# Patient Record
Sex: MALE | Race: WHITE | NOT HISPANIC OR LATINO | Employment: FULL TIME | URBAN - METROPOLITAN AREA
[De-identification: names, ages, dates, MRNs, and addresses within clinical notes are randomized per-mention and may not be internally consistent; named-entity substitution may affect disease eponyms.]

---

## 2018-11-05 ENCOUNTER — OFFICE VISIT (OUTPATIENT)
Dept: RETAIL CLINIC | Facility: CLINIC | Age: 63
End: 2018-11-05

## 2018-11-05 DIAGNOSIS — Z23 NEEDS FLU SHOT: Primary | ICD-10-CM

## 2024-03-14 ENCOUNTER — OFFICE VISIT (OUTPATIENT)
Dept: FAMILY MEDICINE CLINIC | Facility: CLINIC | Age: 69
End: 2024-03-14
Payer: MEDICARE

## 2024-03-14 VITALS
RESPIRATION RATE: 20 BRPM | WEIGHT: 182.3 LBS | OXYGEN SATURATION: 97 % | HEIGHT: 67 IN | TEMPERATURE: 98.4 F | BODY MASS INDEX: 28.61 KG/M2 | HEART RATE: 74 BPM | DIASTOLIC BLOOD PRESSURE: 74 MMHG | SYSTOLIC BLOOD PRESSURE: 130 MMHG

## 2024-03-14 DIAGNOSIS — E53.8 VITAMIN B12 DEFICIENCY: Primary | ICD-10-CM

## 2024-03-14 DIAGNOSIS — E55.9 VITAMIN D DEFICIENCY: ICD-10-CM

## 2024-03-14 DIAGNOSIS — E78.2 MIXED HYPERLIPIDEMIA: ICD-10-CM

## 2024-03-14 PROBLEM — H81.399 PERIPHERAL VERTIGO: Status: ACTIVE | Noted: 2024-03-14

## 2024-03-14 PROBLEM — I65.23 CAROTID ATHEROSCLEROSIS, BILATERAL: Status: ACTIVE | Noted: 2024-03-14

## 2024-03-14 RX ORDER — ERGOCALCIFEROL 1.25 MG/1
50000 CAPSULE ORAL WEEKLY
Qty: 6 CAPSULE | Refills: 0 | Status: SHIPPED | OUTPATIENT
Start: 2024-03-14

## 2024-03-14 RX ORDER — ATORVASTATIN CALCIUM 40 MG/1
40 TABLET, FILM COATED ORAL DAILY
Qty: 90 TABLET | Refills: 1 | Status: SHIPPED | OUTPATIENT
Start: 2024-03-14

## 2024-03-14 RX ORDER — CYANOCOBALAMIN 1000 UG/ML
1000 INJECTION, SOLUTION INTRAMUSCULAR; SUBCUTANEOUS DAILY
Status: SHIPPED | OUTPATIENT
Start: 2024-03-14 | End: 2024-03-17

## 2024-03-14 RX ORDER — CYANOCOBALAMIN 1000 UG/ML
1000 INJECTION, SOLUTION INTRAMUSCULAR; SUBCUTANEOUS ONCE
Status: COMPLETED | OUTPATIENT
Start: 2024-03-14 | End: 2024-03-14

## 2024-03-14 RX ORDER — OMEPRAZOLE 20 MG/1
20 CAPSULE, DELAYED RELEASE ORAL DAILY
COMMUNITY

## 2024-03-14 RX ADMIN — CYANOCOBALAMIN 1000 MCG: 1000 INJECTION, SOLUTION INTRAMUSCULAR; SUBCUTANEOUS at 10:16

## 2024-03-14 NOTE — ASSESSMENT & PLAN NOTE
Lab review indicated low Vit D (22.1)  Will prescribed vit d 50,000 units weekly for the next 6 weeks and reassess for continuing supplementation

## 2024-03-14 NOTE — ASSESSMENT & PLAN NOTE
Labs reviewed indicate low vitamin B12 (185). Pt was not taking PO vit B12 1000 mcg as prescribed  Will prescribed Vit B12 injections shots for the next 3 days and continue on PO supplement afterwards  Will repeat Vit B12 level at the next scheduled apt

## 2024-03-14 NOTE — ASSESSMENT & PLAN NOTE
Lipid abnormalities are worsening. Total cholesterol(214->242) and LDL cholesterol (148->164)  ASCVD risk is high based on these values, I recommend starting moderate to high intensity statin therapy    Plan:  Will start on atorvastatin 40 mg daily  Discussed medication dosage, use, side effects, and goals of treatment in detail.    Counseled patient on lifestyle modifications to help control hyperlipidemia.   Cholesterol lowering dietary information shared with patient.  Weight Loss encouraged    Patient Treatment Goals:   LDL goal to be less than 70    Followup at the next regular appointment.

## 2024-03-15 ENCOUNTER — CLINICAL SUPPORT (OUTPATIENT)
Dept: FAMILY MEDICINE CLINIC | Facility: CLINIC | Age: 69
End: 2024-03-15
Payer: MEDICARE

## 2024-03-15 DIAGNOSIS — E53.8 VITAMIN B12 DEFICIENCY: Primary | ICD-10-CM

## 2024-03-15 PROCEDURE — 96372 THER/PROPH/DIAG INJ SC/IM: CPT | Performed by: INTERNAL MEDICINE

## 2024-03-15 RX ADMIN — CYANOCOBALAMIN 1000 MCG: 1000 INJECTION, SOLUTION INTRAMUSCULAR; SUBCUTANEOUS at 10:47

## 2024-03-18 ENCOUNTER — CLINICAL SUPPORT (OUTPATIENT)
Dept: FAMILY MEDICINE CLINIC | Facility: CLINIC | Age: 69
End: 2024-03-18
Payer: MEDICARE

## 2024-03-18 DIAGNOSIS — E53.8 VITAMIN B12 DEFICIENCY: Primary | ICD-10-CM

## 2024-03-18 RX ORDER — CYANOCOBALAMIN 1000 UG/ML
1000 INJECTION, SOLUTION INTRAMUSCULAR; SUBCUTANEOUS
Status: SHIPPED | OUTPATIENT
Start: 2024-03-18

## 2024-03-18 RX ADMIN — CYANOCOBALAMIN 1000 MCG: 1000 INJECTION, SOLUTION INTRAMUSCULAR; SUBCUTANEOUS at 09:13

## 2024-04-18 DIAGNOSIS — E78.2 MIXED HYPERLIPIDEMIA: Primary | ICD-10-CM

## 2024-04-18 LAB
ALBUMIN SERPL-MCNC: 4.4 G/DL (ref 3.5–5.2)
ALBUMIN/GLOB SERPL: 1.9 G/DL
ALP SERPL-CCNC: 70 U/L (ref 39–117)
ALT SERPL-CCNC: 22 U/L (ref 1–41)
AST SERPL-CCNC: 21 U/L (ref 1–40)
BASOPHILS # BLD AUTO: 0.03 10*3/MM3 (ref 0–0.2)
BASOPHILS NFR BLD AUTO: 0.5 % (ref 0–1.5)
BILIRUB SERPL-MCNC: 0.5 MG/DL (ref 0–1.2)
BUN SERPL-MCNC: 9 MG/DL (ref 8–23)
BUN/CREAT SERPL: 7.3 (ref 7–25)
CALCIUM SERPL-MCNC: 9.6 MG/DL (ref 8.6–10.5)
CHLORIDE SERPL-SCNC: 106 MMOL/L (ref 98–107)
CHOLEST SERPL-MCNC: 153 MG/DL (ref 0–200)
CO2 SERPL-SCNC: 27 MMOL/L (ref 22–29)
CREAT SERPL-MCNC: 1.24 MG/DL (ref 0.76–1.27)
EGFRCR SERPLBLD CKD-EPI 2021: 64.1 ML/MIN/1.73
EOSINOPHIL # BLD AUTO: 0.12 10*3/MM3 (ref 0–0.4)
EOSINOPHIL NFR BLD AUTO: 1.9 % (ref 0.3–6.2)
ERYTHROCYTE [DISTWIDTH] IN BLOOD BY AUTOMATED COUNT: 12.9 % (ref 12.3–15.4)
GLOBULIN SER CALC-MCNC: 2.3 GM/DL
GLUCOSE SERPL-MCNC: 105 MG/DL (ref 65–99)
HCT VFR BLD AUTO: 41.7 % (ref 37.5–51)
HDLC SERPL-MCNC: 51 MG/DL (ref 40–60)
HGB BLD-MCNC: 14.1 G/DL (ref 13–17.7)
IMM GRANULOCYTES # BLD AUTO: 0.02 10*3/MM3 (ref 0–0.05)
IMM GRANULOCYTES NFR BLD AUTO: 0.3 % (ref 0–0.5)
LDLC SERPL CALC-MCNC: 82 MG/DL (ref 0–100)
LDLC/HDLC SERPL: 1.58 {RATIO}
LYMPHOCYTES # BLD AUTO: 2.17 10*3/MM3 (ref 0.7–3.1)
LYMPHOCYTES NFR BLD AUTO: 34.3 % (ref 19.6–45.3)
MCH RBC QN AUTO: 32 PG (ref 26.6–33)
MCHC RBC AUTO-ENTMCNC: 33.8 G/DL (ref 31.5–35.7)
MCV RBC AUTO: 94.6 FL (ref 79–97)
MONOCYTES # BLD AUTO: 0.56 10*3/MM3 (ref 0.1–0.9)
MONOCYTES NFR BLD AUTO: 8.8 % (ref 5–12)
NEUTROPHILS # BLD AUTO: 3.43 10*3/MM3 (ref 1.7–7)
NEUTROPHILS NFR BLD AUTO: 54.2 % (ref 42.7–76)
NRBC BLD AUTO-RTO: 0 /100 WBC (ref 0–0.2)
PLATELET # BLD AUTO: 246 10*3/MM3 (ref 140–450)
POTASSIUM SERPL-SCNC: 4.4 MMOL/L (ref 3.5–5.2)
PROT SERPL-MCNC: 6.7 G/DL (ref 6–8.5)
RBC # BLD AUTO: 4.41 10*6/MM3 (ref 4.14–5.8)
SODIUM SERPL-SCNC: 142 MMOL/L (ref 136–145)
TRIGL SERPL-MCNC: 108 MG/DL (ref 0–150)
VLDLC SERPL CALC-MCNC: 20 MG/DL (ref 5–40)
WBC # BLD AUTO: 6.33 10*3/MM3 (ref 3.4–10.8)

## 2024-04-25 ENCOUNTER — OFFICE VISIT (OUTPATIENT)
Dept: FAMILY MEDICINE CLINIC | Facility: CLINIC | Age: 69
End: 2024-04-25
Payer: MEDICARE

## 2024-04-25 VITALS
DIASTOLIC BLOOD PRESSURE: 86 MMHG | HEIGHT: 67 IN | TEMPERATURE: 98.1 F | RESPIRATION RATE: 16 BRPM | SYSTOLIC BLOOD PRESSURE: 136 MMHG | WEIGHT: 186.7 LBS | BODY MASS INDEX: 29.3 KG/M2 | HEART RATE: 68 BPM | OXYGEN SATURATION: 98 %

## 2024-04-25 DIAGNOSIS — Z12.11 SCREEN FOR COLON CANCER: ICD-10-CM

## 2024-04-25 DIAGNOSIS — Z23 NEED FOR VACCINATION: ICD-10-CM

## 2024-04-25 DIAGNOSIS — E78.2 MIXED HYPERLIPIDEMIA: Primary | ICD-10-CM

## 2024-04-25 DIAGNOSIS — E55.9 VITAMIN D DEFICIENCY, UNSPECIFIED: ICD-10-CM

## 2024-04-25 DIAGNOSIS — E53.8 VITAMIN B12 DEFICIENCY: ICD-10-CM

## 2024-04-25 LAB
25(OH)D3+25(OH)D2 SERPL-MCNC: 36.7 NG/ML (ref 30–100)
VIT B12 SERPL-MCNC: 440 PG/ML (ref 211–946)

## 2024-04-25 NOTE — PROGRESS NOTES
The ABCs of the Annual Wellness Visit  Subsequent Medicare Wellness Visit    Subjective    Hector Diamond is a 68 y.o. male who presents for a Subsequent Medicare Wellness Visit.    The following portions of the patient's history were reviewed and   updated as appropriate: allergies, current medications, past family history, past medical history, past social history, past surgical history, and problem list.    Compared to one year ago, the patient feels his physical   health is the same.    Compared to one year ago, the patient feels his mental   health is the same.    Recent Hospitalizations:  He was not admitted to the hospital during the last year.       Current Medical Providers:  Patient Care Team:  Ivonne Ortiz MD as PCP - General (Internal Medicine)  Leandro Cash MD as Consulting Physician (Gastroenterology)    Outpatient Medications Prior to Visit   Medication Sig Dispense Refill    atorvastatin (Lipitor) 40 MG tablet Take 1 tablet by mouth Daily. 90 tablet 1    cyanocobalamin ([facility] Vitamin B-12) 1000 MCG tablet Take 1 tablet by mouth Daily. 30 tablet 2    omeprazole (priLOSEC) 20 MG capsule Take 1 capsule by mouth Daily.      vitamin D (ERGOCALCIFEROL) 1.25 MG (75694 UT) capsule capsule Take 1 capsule by mouth 1 (One) Time Per Week. 6 capsule 0     Facility-Administered Medications Prior to Visit   Medication Dose Route Frequency Provider Last Rate Last Admin    cyanocobalamin injection 1,000 mcg  1,000 mcg Intramuscular Q28 Days Ivonne Ortiz MD   1,000 mcg at 03/18/24 0913       No opioid medication identified on active medication list. I have reviewed chart for other potential  high risk medication/s and harmful drug interactions in the elderly.        Aspirin is not on active medication list.  Aspirin use is not indicated based on review of current medical condition/s. Risk of harm outweighs potential benefits.  .    Patient Active Problem List   Diagnosis    Carotid atherosclerosis,  "bilateral    Peripheral vertigo    Vitamin B12 deficiency    Vitamin D deficiency, unspecified    Mixed hyperlipidemia     Advance Care Planning   Advance Care Planning     Advance Directive is not on file.  ACP discussion was held with the patient during this visit. Patient does not have an advance directive, information provided.     Objective    Vitals:    04/25/24 0906   BP: 136/86   BP Location: Right arm   Patient Position: Sitting   Cuff Size: Adult   Pulse: 68   Resp: 16   Temp: 98.1 °F (36.7 °C)   TempSrc: Oral   SpO2: 98%   Weight: 84.7 kg (186 lb 11.2 oz)   Height: 170.2 cm (67.01\")     Estimated body mass index is 29.23 kg/m² as calculated from the following:    Height as of this encounter: 170.2 cm (67.01\").    Weight as of this encounter: 84.7 kg (186 lb 11.2 oz).    BMI is >= 25 and <30. (Overweight) The following options were offered after discussion;: weight loss educational material (shared in after visit summary), exercise counseling/recommendations, and nutrition counseling/recommendations      Does the patient have evidence of cognitive impairment? Yes    Lab Results   Component Value Date    CHLPL 153 04/18/2024    TRIG 108 04/18/2024    HDL 51 04/18/2024    LDL 82 04/18/2024    VLDL 20 04/18/2024        HEALTH RISK ASSESSMENT    Smoking Status:  Social History     Tobacco Use   Smoking Status Former    Current packs/day: 0.00    Average packs/day: 1 pack/day for 34.0 years (34.0 ttl pk-yrs)    Types: Cigarettes    Start date: 01/1975    Quit date: 01/2009    Years since quitting: 15.3    Passive exposure: Past   Smokeless Tobacco Never     Alcohol Consumption:  Social History     Substance and Sexual Activity   Alcohol Use Yes    Comment: ONCE A WEEK     Fall Risk Screen:    RONITADI Fall Risk Assessment was completed, and patient is at MODERATE risk for falls. Assessment completed on:3/14/2024    Depression Screening:      3/14/2024     9:12 AM   PHQ-2/PHQ-9 Depression Screening   Little " Interest or Pleasure in Doing Things 0-->not at all   Feeling Down, Depressed or Hopeless 0-->not at all   PHQ-9: Brief Depression Severity Measure Score 0       Health Habits and Functional and Cognitive Screening:       No data to display                Age-appropriate Screening Schedule:  Refer to the list below for future screening recommendations based on patient's age, sex and/or medical conditions. Orders for these recommended tests are listed in the plan section. The patient has been provided with a written plan.    Health Maintenance   Topic Date Due    COLORECTAL CANCER SCREENING  Never done    RSV Vaccine - Adults (1 - 1-dose 60+ series) Never done    ANNUAL WELLNESS VISIT  04/11/2024    ZOSTER VACCINE (1 of 2) 04/25/2024 (Originally 6/14/2007)    TDAP/TD VACCINES (1 - Tdap) 04/25/2025 (Originally 6/14/1976)    INFLUENZA VACCINE  08/01/2024    LIPID PANEL  04/18/2025    BMI FOLLOWUP  04/25/2025    HEPATITIS C SCREENING  Completed    COVID-19 Vaccine  Completed    Pneumococcal Vaccine 65+  Completed    AAA SCREEN (ONE-TIME)  Completed                  CMS Preventative Services Quick Reference  Risk Factors Identified During Encounter  Immunizations Discussed/Encouraged: Tdap, Prevnar 20 (Pneumococcal 20-valent conjugate), Shingrix, and RSV (Respiratory Syncytial Virus)  Tobacco Use/Dependance Risk (use dotphrase .tobaccocessation for documentation)  Dental Screening Recommended  Vision Screening Recommended  The above risks/problems have been discussed with the patient.  Pertinent information has been shared with the patient in the After Visit Summary.  An After Visit Summary and PPPS were made available to the patient.    Follow Up:   Next Medicare Wellness visit to be scheduled in 1 year.       Additional E&M Note during same encounter follows:  Patient has multiple medical problems which are significant and separately identifiable that require additional work above and beyond the Medicare Wellness  "Visit.      Chief Complaint  Annual Exam, Hyperlipidemia, and Vitamin D Deficiency    Subjective        Hyperlipidemia      Hector Diamond is also being seen today for annual physical and follow up of hyperlipidemia, vit B 12 and D deficiency. He has no complaint today.  He has been trying to maintain a healthy diet, eating more salads and has cut down on red meat & fatty foods.   Lab review indicate lipid abnormalities significantly improved. Has been compliant on atorvastatin. To get vit B12 and D levels today.         Objective   Vital Signs:  /86 (BP Location: Right arm, Patient Position: Sitting, Cuff Size: Adult)   Pulse 68   Temp 98.1 °F (36.7 °C) (Oral)   Resp 16   Ht 170.2 cm (67.01\")   Wt 84.7 kg (186 lb 11.2 oz)   SpO2 98%   BMI 29.23 kg/m²     Physical Exam  Constitutional:       Appearance: Normal appearance.   HENT:      Head: Normocephalic and atraumatic.   Cardiovascular:      Rate and Rhythm: Normal rate and regular rhythm.      Heart sounds: Normal heart sounds.   Pulmonary:      Breath sounds: Normal breath sounds.   Abdominal:      General: Bowel sounds are normal. There is no distension.      Palpations: Abdomen is soft.      Tenderness: There is no abdominal tenderness.   Neurological:      Mental Status: He is alert and oriented to person, place, and time.   Psychiatric:         Mood and Affect: Mood normal.          The following data was reviewed by: Ivonne Ortiz MD on 04/25/2024:  Common labs          2/20/2024    09:34 4/18/2024    08:17   Common Labs   Glucose  105    BUN  9    Creatinine  1.24    Sodium  142    Potassium  4.4    Chloride  106    Calcium  9.6    Total Protein  6.7    Albumin  4.4    Total Bilirubin  0.5    Alkaline Phosphatase  70    AST (SGOT)  21    ALT (SGPT)  22    WBC 14.41     6.33    Hemoglobin 13.8     14.1    Hematocrit 41.2     41.7    Platelets 559     246    Total Cholesterol  153    Triglycerides  108    HDL Cholesterol  51    LDL Cholesterol  "  82       Details          This result is from an external source.                        Assessment and Plan   Diagnoses and all orders for this visit:    1. Mixed hyperlipidemia (Primary)  Assessment & Plan:   Lipid abnormalities are improving with treatment    Plan:  Continue same medication/s without change.  Atorvastatin 40 mg daily  Counseled patient on lifestyle modifications to help control hyperlipidemia.   Cholesterol lowering dietary information shared with patient.  Advised patient to exercise for 150 minutes weekly. (30 minute brisk walk, 5 days a week for example)  Weight Loss encouraged    Patient Treatment Goals:   LDL goal to be less than 70    Followup in 6 months.      2. Vitamin D deficiency, unspecified  Assessment & Plan:  Pt has received 6 doses of weekly vit D 50,000 units  To repeat Vit D today    Orders:  -     Vitamin D,25-Hydroxy    3. Vitamin B12 deficiency  Assessment & Plan:  Lab review indicated very low vit B 12 (185)  Pt has received 3 doses on IM vit B 12 and is currently on daily oral Vit B12 supplementation  To repeat Vit B 12 today    Orders:  -     Vitamin B12    4. Need for vaccination  Comments:  Counselled patient to get RSV vaccine at his preferred pharmacy  Pt refused Tdap and shingrix vaccination  To get PCV 20 today  Orders:  -     Pneumococcal Conjugate Vaccine 20-Valent (PCV20)    5. Screen for colon cancer  -     Ambulatory Referral For Screening Colonoscopy             Follow Up   No follow-ups on file.  Patient was given instructions and counseling regarding his condition or for health maintenance advice. Please see specific information pulled into the AVS if appropriate.

## 2024-04-25 NOTE — ASSESSMENT & PLAN NOTE
Lipid abnormalities are improving with treatment    Plan:  Continue same medication/s without change.  Atorvastatin 40 mg daily  Counseled patient on lifestyle modifications to help control hyperlipidemia.   Cholesterol lowering dietary information shared with patient.  Advised patient to exercise for 150 minutes weekly. (30 minute brisk walk, 5 days a week for example)  Weight Loss encouraged    Patient Treatment Goals:   LDL goal to be less than 70    Followup in 6 months.

## 2024-04-25 NOTE — ASSESSMENT & PLAN NOTE
Lab review indicated very low vit B 12 (185)  Pt has received 3 doses on IM vit B 12 and is currently on daily oral Vit B12 supplementation  To repeat Vit B 12 today

## 2024-05-10 DIAGNOSIS — E55.9 VITAMIN D DEFICIENCY: ICD-10-CM

## 2024-05-13 RX ORDER — ERGOCALCIFEROL 1.25 MG/1
50000 CAPSULE ORAL WEEKLY
Qty: 6 CAPSULE | Refills: 0 | Status: SHIPPED | OUTPATIENT
Start: 2024-05-13

## 2024-05-13 NOTE — TELEPHONE ENCOUNTER
Rx Refill Note  Requested Prescriptions     Pending Prescriptions Disp Refills    vitamin D (ERGOCALCIFEROL) 1.25 MG (82995 UT) capsule capsule [Pharmacy Med Name: Vitamin D (Ergocalciferol) Oral Capsule 1.25 MG (55345 UT)] 6 capsule 0     Sig: TAKE 1 CAPSULE BY MOUTH 1 TIME A WEEK      Last office visit with prescribing clinician: 4/25/2024   Last telemedicine visit with prescribing clinician: Visit date not found   Next office visit with prescribing clinician: 11/11/2024                         Would you like a call back once the refill request has been completed: [] Yes [] No    If the office needs to give you a call back, can they leave a voicemail: [] Yes [] No    Joellen Doyle MA  05/13/24, 07:56 EDT

## 2024-07-03 DIAGNOSIS — E55.9 VITAMIN D DEFICIENCY: ICD-10-CM

## 2024-07-05 RX ORDER — ERGOCALCIFEROL 1.25 MG/1
50000 CAPSULE ORAL WEEKLY
Qty: 6 CAPSULE | Refills: 0 | Status: SHIPPED | OUTPATIENT
Start: 2024-07-05

## 2024-07-15 ENCOUNTER — TELEPHONE (OUTPATIENT)
Dept: GASTROENTEROLOGY | Facility: CLINIC | Age: 69
End: 2024-07-15
Payer: MEDICARE

## 2024-07-15 NOTE — TELEPHONE ENCOUNTER
FAST TRACK - REFERRAL - DR. MURPHY  LAST COLONOSCOPY 06/14/2022 BY DR. BETANCOURT - REPEAT IN 6 MONTHS  PERSONAL HISTORY POLYPS  NO FAMILY HISTORY CRC/P  SCHEDULE AT [place].

## 2024-07-17 ENCOUNTER — PREP FOR SURGERY (OUTPATIENT)
Dept: SURGERY | Facility: SURGERY CENTER | Age: 69
End: 2024-07-17

## 2024-07-17 DIAGNOSIS — Z86.010 HISTORY OF COLON POLYPS: ICD-10-CM

## 2024-07-17 DIAGNOSIS — Z12.11 ENCOUNTER FOR SCREENING FOR MALIGNANT NEOPLASM OF COLON: Primary | ICD-10-CM

## 2024-07-22 PROBLEM — Z86.010 HISTORY OF COLON POLYPS: Status: ACTIVE | Noted: 2024-07-17

## 2024-07-22 PROBLEM — Z12.11 ENCOUNTER FOR SCREENING FOR MALIGNANT NEOPLASM OF COLON: Status: ACTIVE | Noted: 2024-07-17

## 2024-07-22 PROBLEM — Z86.0100 HISTORY OF COLON POLYPS: Status: ACTIVE | Noted: 2024-07-17

## 2024-08-02 ENCOUNTER — TELEPHONE (OUTPATIENT)
Dept: GASTROENTEROLOGY | Facility: CLINIC | Age: 69
End: 2024-08-02
Payer: MEDICARE

## 2024-08-02 NOTE — TELEPHONE ENCOUNTER
Received emailed from [facility] scheduling:          He has been removed from the schedule.                  [facility]          Surgical Services   [facility]  [address] [place] [address]    Phone: [phone]  Fax: [phone]

## 2024-09-07 DIAGNOSIS — E55.9 VITAMIN D DEFICIENCY: ICD-10-CM

## 2024-09-09 RX ORDER — ERGOCALCIFEROL 1.25 MG/1
50000 CAPSULE, LIQUID FILLED ORAL WEEKLY
Qty: 6 CAPSULE | Refills: 3 | Status: SHIPPED | OUTPATIENT
Start: 2024-09-09

## 2024-10-02 ENCOUNTER — PATIENT OUTREACH (OUTPATIENT)
Dept: FAMILY MEDICINE CLINIC | Facility: CLINIC | Age: 69
End: 2024-10-02
Payer: MEDICARE

## 2024-11-06 DIAGNOSIS — E53.8 VITAMIN B12 DEFICIENCY: ICD-10-CM

## 2024-11-06 DIAGNOSIS — E55.9 VITAMIN D DEFICIENCY, UNSPECIFIED: Primary | ICD-10-CM

## 2024-11-06 DIAGNOSIS — Z12.5 SCREENING PSA (PROSTATE SPECIFIC ANTIGEN): ICD-10-CM

## 2024-11-06 DIAGNOSIS — E78.2 MIXED HYPERLIPIDEMIA: ICD-10-CM

## 2024-11-07 LAB
25(OH)D3+25(OH)D2 SERPL-MCNC: 40.3 NG/ML (ref 30–100)
ALBUMIN SERPL-MCNC: 4.4 G/DL (ref 3.5–5.2)
ALBUMIN/GLOB SERPL: 1.8 G/DL
ALP SERPL-CCNC: 69 U/L (ref 39–117)
ALT SERPL-CCNC: 29 U/L (ref 1–41)
AST SERPL-CCNC: 23 U/L (ref 1–40)
BILIRUB SERPL-MCNC: 0.6 MG/DL (ref 0–1.2)
BUN SERPL-MCNC: 13 MG/DL (ref 8–23)
BUN/CREAT SERPL: 11.7 (ref 7–25)
CALCIUM SERPL-MCNC: 9.2 MG/DL (ref 8.6–10.5)
CHLORIDE SERPL-SCNC: 105 MMOL/L (ref 98–107)
CHOLEST SERPL-MCNC: 169 MG/DL (ref 0–200)
CO2 SERPL-SCNC: 25 MMOL/L (ref 22–29)
CREAT SERPL-MCNC: 1.11 MG/DL (ref 0.76–1.27)
EGFRCR SERPLBLD CKD-EPI 2021: 72.8 ML/MIN/1.73
GLOBULIN SER CALC-MCNC: 2.4 GM/DL
GLUCOSE SERPL-MCNC: 105 MG/DL (ref 65–99)
HDLC SERPL-MCNC: 45 MG/DL (ref 40–60)
LDLC SERPL CALC-MCNC: 105 MG/DL (ref 0–100)
LDLC/HDLC SERPL: 2.28 {RATIO}
POTASSIUM SERPL-SCNC: 4 MMOL/L (ref 3.5–5.2)
PROT SERPL-MCNC: 6.8 G/DL (ref 6–8.5)
PSA SERPL-MCNC: 1.06 NG/ML (ref 0–4)
SODIUM SERPL-SCNC: 142 MMOL/L (ref 136–145)
TRIGL SERPL-MCNC: 106 MG/DL (ref 0–150)
VIT B12 SERPL-MCNC: 403 PG/ML (ref 211–946)
VLDLC SERPL CALC-MCNC: 19 MG/DL (ref 5–40)

## 2024-11-11 ENCOUNTER — OFFICE VISIT (OUTPATIENT)
Dept: FAMILY MEDICINE CLINIC | Facility: CLINIC | Age: 69
End: 2024-11-11
Payer: MEDICARE

## 2024-11-11 VITALS
TEMPERATURE: 97 F | HEIGHT: 67 IN | SYSTOLIC BLOOD PRESSURE: 128 MMHG | OXYGEN SATURATION: 96 % | HEART RATE: 79 BPM | DIASTOLIC BLOOD PRESSURE: 70 MMHG | RESPIRATION RATE: 14 BRPM | WEIGHT: 185.2 LBS | BODY MASS INDEX: 29.07 KG/M2

## 2024-11-11 DIAGNOSIS — Z23 NEED FOR VACCINATION: ICD-10-CM

## 2024-11-11 DIAGNOSIS — E78.2 MIXED HYPERLIPIDEMIA: Primary | ICD-10-CM

## 2024-11-11 DIAGNOSIS — R07.9 RIGHT-SIDED CHEST PAIN: ICD-10-CM

## 2024-11-11 DIAGNOSIS — E55.9 VITAMIN D DEFICIENCY, UNSPECIFIED: ICD-10-CM

## 2024-11-11 DIAGNOSIS — E53.8 VITAMIN B12 DEFICIENCY: ICD-10-CM

## 2024-11-11 PROCEDURE — 93000 ELECTROCARDIOGRAM COMPLETE: CPT | Performed by: INTERNAL MEDICINE

## 2024-11-11 PROCEDURE — 1159F MED LIST DOCD IN RCRD: CPT | Performed by: INTERNAL MEDICINE

## 2024-11-11 PROCEDURE — G0008 ADMIN INFLUENZA VIRUS VAC: HCPCS | Performed by: INTERNAL MEDICINE

## 2024-11-11 PROCEDURE — 99214 OFFICE O/P EST MOD 30 MIN: CPT | Performed by: INTERNAL MEDICINE

## 2024-11-11 PROCEDURE — 90662 IIV NO PRSV INCREASED AG IM: CPT | Performed by: INTERNAL MEDICINE

## 2024-11-11 PROCEDURE — 1126F AMNT PAIN NOTED NONE PRSNT: CPT | Performed by: INTERNAL MEDICINE

## 2024-11-11 PROCEDURE — 1160F RVW MEDS BY RX/DR IN RCRD: CPT | Performed by: INTERNAL MEDICINE

## 2024-11-11 NOTE — PROGRESS NOTES
"  Chief Complaint   Patient presents with    Hyperlipidemia    Results     FUP LABS    Chest Pain    Shortness of Breath      History of Present Illness:  Patient is here for follow-up of hyperlipidemia, vitamin D and B12 deficiency.  He reports intermittent right-sided chest pain that alcohol couple of times a week about a month ago but has subsided.  Last episode was about a month ago, associated with mild SOB.  Pain is mild, lasting for couple of seconds and spontaneously goes away, nonradiating, no known relieving or aggravating factor.  No associated nausea or vomiting, lightheadedness or sweating.  He stopped smoking pot [THC] symptoms seems to have improved.    PMH:   Outpatient Medications Prior to Visit   Medication Sig Dispense Refill    atorvastatin (Lipitor) 40 MG tablet Take 1 tablet by mouth Daily. 90 tablet 1    cyanocobalamin ([facility] Vitamin B-12) 1000 MCG tablet Take 1 tablet by mouth Daily. 30 tablet 2    omeprazole (priLOSEC) 20 MG capsule Take 1 capsule by mouth Daily.      vitamin D (ERGOCALCIFEROL) 1.25 MG (28213 UT) capsule capsule TAKE 1 CAPSULE BY MOUTH 1 TIME A WEEK 6 capsule 3     No facility-administered medications prior to visit.      Allergies   Allergen Reactions    Levaquin [Levofloxacin]      History reviewed. No pertinent surgical history.  family history is not on file.   reports that he quit smoking about 15 years ago. His smoking use included cigarettes. He started smoking about 49 years ago. He has a 34 pack-year smoking history. He has been exposed to tobacco smoke. He has never used smokeless tobacco. He reports current alcohol use. He reports current drug use. Drug: Marijuana.     /70   Pulse 79   Temp 97 °F (36.1 °C) (Temporal)   Resp 14   Ht 170.2 cm (67.01\")   Wt 84 kg (185 lb 3.2 oz)   SpO2 96%   BMI 29.00 kg/m²   Physical Exam  Constitutional:       Appearance: Normal appearance.   HENT:      Head: Normocephalic and atraumatic.   Cardiovascular:      Rate and " Rhythm: Normal rate and regular rhythm.      Pulses: Normal pulses.      Heart sounds: Normal heart sounds.   Pulmonary:      Effort: Pulmonary effort is normal. No respiratory distress.      Breath sounds: Normal breath sounds. No wheezing.   Chest:      Chest wall: No tenderness.   Abdominal:      General: Bowel sounds are normal.      Palpations: Abdomen is soft.      Tenderness: There is no abdominal tenderness.   Neurological:      Mental Status: He is alert.          The following data was reviewed by: Ivonne Ortiz MD on 11/11/2024:  CMP          4/18/2024    08:17 11/6/2024    09:04   CMP   Glucose 105  105    BUN 9  13    Creatinine 1.24  1.11    Sodium 142  142    Potassium 4.4  4.0    Chloride 106  105    Calcium 9.6  9.2    Total Protein 6.7  6.8    Albumin 4.4  4.4    Globulin 2.3  2.4    Total Bilirubin 0.5  0.6    Alkaline Phosphatase 70  69    AST (SGOT) 21  23    ALT (SGPT) 22  29    BUN/Creatinine Ratio 7.3  11.7      Lipid Panel          4/18/2024    08:17 11/6/2024    09:04   Lipid Panel   Total Cholesterol 153  169    Triglycerides 108  106    HDL Cholesterol 51  45    VLDL Cholesterol 20  19    LDL Cholesterol  82  105    LDL/HDL Ratio 1.58  2.28      Data reviewed : Cardiology studies EKG normal, no sign of ischemia or infarction      Diagnoses and all orders for this visit:    1. Mixed hyperlipidemia (Primary)  Assessment & Plan:   Lipid abnormalities are worsening due to medication noncompliance    Plan:  Counseled patient on the importance of medication compliance  To Continue same medication/s without change.    Counseled patient on lifestyle modifications to help control hyperlipidemia.   Cholesterol lowering dietary information shared with patient.  Advised patient to exercise for 150 minutes weekly. (30 minute brisk walk, 5 days a week for example)  Weight Loss encouraged  Stop tobacco use encouraged    Patient Treatment Goals:   LDL goal less than 70    Followup in 6 months.      2.  Vitamin D deficiency, unspecified  Assessment & Plan:  Serum vitamin D level improving  Continue on vitamin D supplementation daily      3. Vitamin B12 deficiency  Assessment & Plan:  Serum Vitamin B12 improving  Continue on vitamin B12 supplements daily      4. Right-sided chest pain  Comments:  EKG normal  Tylenol/ibuprofen as needed  Orders:  -     ECG 12 Lead    5. Need for vaccination  -     Fluzone High-Dose 65+yrs (0409-0862)             Return in about 6 months (around 5/11/2025) for Follow up with fasting lipid panel.

## 2024-11-11 NOTE — ASSESSMENT & PLAN NOTE
Lipid abnormalities are worsening due to medication noncompliance    Plan:  Counseled patient on the importance of medication compliance  To Continue same medication/s without change.    Counseled patient on lifestyle modifications to help control hyperlipidemia.   Cholesterol lowering dietary information shared with patient.  Advised patient to exercise for 150 minutes weekly. (30 minute brisk walk, 5 days a week for example)  Weight Loss encouraged  Stop tobacco use encouraged    Patient Treatment Goals:   LDL goal less than 70    Followup in 6 months.

## 2025-03-22 DIAGNOSIS — E78.2 MIXED HYPERLIPIDEMIA: ICD-10-CM

## 2025-03-24 RX ORDER — ATORVASTATIN CALCIUM 40 MG/1
40 TABLET, FILM COATED ORAL DAILY
Qty: 90 TABLET | Refills: 0 | Status: SHIPPED | OUTPATIENT
Start: 2025-03-24

## 2025-04-13 DIAGNOSIS — E55.9 VITAMIN D DEFICIENCY: ICD-10-CM

## 2025-04-14 RX ORDER — ERGOCALCIFEROL 1.25 MG/1
50000 CAPSULE, LIQUID FILLED ORAL WEEKLY
Qty: 6 CAPSULE | Refills: 0 | Status: SHIPPED | OUTPATIENT
Start: 2025-04-14

## 2025-05-20 NOTE — PROGRESS NOTES
"    Chief Complaint   Patient presents with    Establish Care     Spots on legs/arms, establishing care      History of Present Illness:  Patient presented to the clinic today to establish care.  He has a history of peripheral vertigo and bilateral carotid atherosclerosis.  He was seen last month at the ED for vertigo, was given meclizine and Zofran PRN for nausea.  Today, he reports to be doing okay in general, no longer feeling nauseous or having vertigo.  No complaints today, just looking to establish care with a new PCP.      Labs review was indicative of vitamin B12 (185) and vitamin D deficiency(22.1).  Patient was not taking his vitamin B12 and D supplement as prescribed.    Outpatient Medications Prior to Visit   Medication Sig Dispense Refill    omeprazole (priLOSEC) 20 MG capsule Take 1 capsule by mouth Daily.      cyanocobalamin ([employer] Vitamin B-12) 1000 MCG tablet Take  by mouth. (Patient not taking: Reported on 3/14/2024)      VITAMIN D PO Take  by mouth. (Patient not taking: Reported on 3/14/2024)       No facility-administered medications prior to visit.      Allergies   Allergen Reactions    Levaquin [Levofloxacin]      History reviewed. No pertinent surgical history.  family history is not on file.   reports that he has quit smoking. His smoking use included cigarettes. He started smoking about 49 years ago. He has a 34 pack-year smoking history. He has been exposed to tobacco smoke. He has never used smokeless tobacco. He reports current alcohol use. He reports current drug use. Drug: Marijuana.     /74 (BP Location: Right arm, Patient Position: Sitting, Cuff Size: Adult)   Pulse 74   Temp 98.4 °F (36.9 °C) (Oral)   Resp 20   Ht 170.2 cm (67\")   Wt 82.7 kg (182 lb 4.8 oz)   SpO2 97%   BMI 28.55 kg/m²   Physical Exam  Constitutional:       Appearance: Normal appearance.   HENT:      Head: Normocephalic and atraumatic.   Cardiovascular:      Rate and Rhythm: Normal rate and regular rhythm. "      Heart sounds: Normal heart sounds.   Pulmonary:      Breath sounds: Normal breath sounds.   Abdominal:      General: Bowel sounds are normal.      Palpations: Abdomen is soft.   Skin:     General: Skin is warm and dry.   Neurological:      Mental Status: He is alert and oriented to person, place, and time.   Psychiatric:         Mood and Affect: Mood normal.          The following data was reviewed by: Ivonne Ortiz MD on 03/14/2024:  Common labs          4/11/2023    13:42 2/20/2024    09:34   Common Labs   WBC 7.52     14.41       Hemoglobin 14.7     13.8       Hematocrit 45.0     41.2       Platelets 280     559       Hemoglobin A1C 5.5        PSA 0.77           Details          This result is from an external source.                  Diagnoses and all orders for this visit:    1. Vitamin B12 deficiency (Primary)  -     cyanocobalamin injection 1,000 mcg  -     cyanocobalamin ([employer] Vitamin B-12) 1000 MCG tablet; Take 1 tablet by mouth Daily.  Dispense: 30 tablet; Refill: 2  -     cyanocobalamin injection 1,000 mcg    2. Vitamin D deficiency  -     vitamin D (ERGOCALCIFEROL) 1.25 MG (51224 UT) capsule capsule; Take 1 capsule by mouth 1 (One) Time Per Week.  Dispense: 6 capsule; Refill: 0    3. Mixed hyperlipidemia             No follow-ups on file.      [Patient] : patient [Family Member] : family member [FreeTextEntry1] : taxing effort to leave home due to multiple health conditions [FreeTextEntry2] : Chance Bustos 86 y/o male with PMHx of AFib, COPD, HTN, MI, hypercholesteremia, morbid obesity, compression fracture, and depression.  Patient is being seen today for TCM. Spouse accompanied patient during today's visit.  [] Today's Visit and Review - 5/13/2025 - AOx3, denies SOB, CP or dizziness, no changes in bladder and bowel functions. - Mucus heard in lungs on exam. Advised Mucinex and antibiotic sent to pharmacy. - 5 days of Cephaloxin remaining. - Patient to follow with in-home PT. Farhat (PT) arrived during visit. - HHA to attend 4 hours/day, 5 days/week. Patient reluctant to permit this but family believe this will be helpful. Encouraged by provider.  - Stitches removed during visit. once spot with stitch under the skin and bleeding. Steri Strips placed during visit. Bacitracin to be used on incision. Will come back to remove the last stitch next week - Medications reviewed and reconciled during visit.   # Ambulation: homebound, limited ambulation with walker # Cognition and memory: good # Mood: fair # Communication: good, responding appropriately # Appetite: no reported issues  <> Dysphagia: # Sleep hygiene: no reported issues. # BM pattern: stool is soft, no blood in it, # Urinary: denies increased frequency, pain with urination, change to color or odor of urine. # Skin: incision sutures removed # Sensory deficits: mildly [place], does not use hearing aids # Compliance:  #Social -  with two adult children. - Former smoker.  #DMEs - Walker - [employer]  Review of systems otherwise NEGATIVE. # Chronic disease:  # Specialists:

## 2025-05-23 ENCOUNTER — OFFICE VISIT (OUTPATIENT)
Dept: FAMILY MEDICINE CLINIC | Facility: CLINIC | Age: 70
End: 2025-05-23
Payer: MEDICARE

## 2025-05-23 VITALS
BODY MASS INDEX: 29.19 KG/M2 | HEART RATE: 66 BPM | DIASTOLIC BLOOD PRESSURE: 76 MMHG | RESPIRATION RATE: 16 BRPM | SYSTOLIC BLOOD PRESSURE: 140 MMHG | HEIGHT: 67 IN | TEMPERATURE: 97.7 F | WEIGHT: 186 LBS | OXYGEN SATURATION: 97 %

## 2025-05-23 DIAGNOSIS — E55.9 VITAMIN D DEFICIENCY, UNSPECIFIED: ICD-10-CM

## 2025-05-23 DIAGNOSIS — S33.5XXA LUMBAR SPRAIN, INITIAL ENCOUNTER: ICD-10-CM

## 2025-05-23 DIAGNOSIS — Z00.00 MEDICARE ANNUAL WELLNESS VISIT, SUBSEQUENT: Primary | ICD-10-CM

## 2025-05-23 DIAGNOSIS — K21.9 GASTROESOPHAGEAL REFLUX DISEASE WITHOUT ESOPHAGITIS: ICD-10-CM

## 2025-05-23 DIAGNOSIS — E53.8 VITAMIN B12 DEFICIENCY: ICD-10-CM

## 2025-05-23 DIAGNOSIS — Z91.81 AT MODERATE RISK FOR FALL: ICD-10-CM

## 2025-05-23 DIAGNOSIS — E78.2 MIXED HYPERLIPIDEMIA: ICD-10-CM

## 2025-05-23 NOTE — PROGRESS NOTES
Subjective   The ABCs of the Annual Wellness Visit  Medicare Wellness Visit      Hector Diamond is a 69 y.o. patient who presents for a Medicare Wellness Visit.    The following portions of the patient's history were reviewed and   updated as appropriate: allergies, current medications, past family history, past medical history, past social history, past surgical history, and problem list.    Compared to one year ago, the patient's physical   health is the same.  Compared to one year ago, the patient's mental   health is the same.    Recent Hospitalizations:  He was not admitted to the hospital during the last year.     Current Medical Providers:  Patient Care Team:  Ivonne Ortiz MD as PCP - General (Internal Medicine)  Leandro Cash MD as Consulting Physician (Gastroenterology)    Outpatient Medications Prior to Visit   Medication Sig Dispense Refill    atorvastatin (LIPITOR) 40 MG tablet TAKE 1 TABLET BY MOUTH EVERY DAY 90 tablet 0    cyanocobalamin ([employer] Vitamin B-12) 1000 MCG tablet Take 1 tablet by mouth Daily. 30 tablet 2    omeprazole (priLOSEC) 20 MG capsule Take 1 capsule by mouth Daily.      vitamin D (ERGOCALCIFEROL) 1.25 MG (76705 UT) capsule capsule TAKE 1 CAPSULE BY MOUTH 1 TIME A WEEK 6 capsule 0     No facility-administered medications prior to visit.     No opioid medication identified on active medication list. I have reviewed chart for other potential  high risk medication/s and harmful drug interactions in the elderly.      Aspirin is not on active medication list.  Aspirin use is not indicated based on review of current medical condition/s. Risk of harm outweighs potential benefits.  .    Patient Active Problem List   Diagnosis    Carotid atherosclerosis, bilateral    Peripheral vertigo    Vitamin B12 deficiency    Vitamin D deficiency, unspecified    Mixed hyperlipidemia    Encounter for screening for malignant neoplasm of colon    History of colon polyps     Advance Care Planning  "Advance Directive is not on file.  ACP discussion was held with the patient during this visit. Patient does not have an advance directive, information provided.            Objective   Vitals:    25 0903   BP: 140/76   BP Location: Right arm   Patient Position: Sitting   Cuff Size: Adult   Pulse: 66   Resp: 16   Temp: 97.7 °F (36.5 °C)   TempSrc: Temporal   SpO2: 97%   Weight: 84.4 kg (186 lb)   Height: 170.2 cm (67.01\")       Estimated body mass index is 29.12 kg/m² as calculated from the following:    Height as of this encounter: 170.2 cm (67.01\").    Weight as of this encounter: 84.4 kg (186 lb).    BMI is >= 25 and <30. (Overweight) The following options were offered after discussion;: weight loss educational material (shared in after visit summary), exercise counseling/recommendations, and nutrition counseling/recommendations           Does the patient have evidence of cognitive impairment? No  Lab Results   Component Value Date    CHLPL 184 2025    TRIG 142 2025    HDL 55 2025     (H) 2025    VLDL 25 2025                                                                                                Health  Risk Assessment    Smoking Status:  Social History     Tobacco Use   Smoking Status Former    Current packs/day: 0.00    Average packs/day: 1 pack/day for 34.0 years (34.0 ttl pk-yrs)    Types: Cigarettes    Start date: 1975    Quit date: 2009    Years since quittin.4    Passive exposure: Past   Smokeless Tobacco Never     Alcohol Consumption:  Social History     Substance and Sexual Activity   Alcohol Use Yes    Comment: ONCE A WEEK       Fall Risk Screen  STEADI Fall Risk Assessment was completed, and patient is at MODERATE risk for falls. Assessment completed on:2025    Depression Screening   Little interest or pleasure in doing things? Not at all   Feeling down, depressed, or hopeless? Not at all   PHQ-2 Total Score 0      Health Habits and " Functional and Cognitive Screening:       No data to display                    Age-appropriate Screening Schedule:  Refer to the list below for future screening recommendations based on patient's age, sex and/or medical conditions. Orders for these recommended tests are listed in the plan section. The patient has been provided with a written plan.    Health Maintenance List  Health Maintenance   Topic Date Due    TDAP/TD VACCINES (1 - Tdap) Never done    ZOSTER VACCINE (1 of 2) 11/19/2025 (Originally 6/14/2007)    COVID-19 Vaccine (5 - 2024-25 season) 11/23/2025 (Originally 9/1/2024)    INFLUENZA VACCINE  07/01/2025    LIPID PANEL  05/20/2026    ANNUAL WELLNESS VISIT  05/23/2026    COLORECTAL CANCER SCREENING  06/14/2032    HEPATITIS C SCREENING  Completed    Pneumococcal Vaccine 50+  Completed    AAA SCREEN ONCE  Completed                                                                                                                                                CMS Preventative Services Quick Reference  Risk Factors Identified During Encounter  Drug Use/Abuse Identified or Suspected: Patient advised to begin personal counseling. Referral information shared/discussed with patient.   Immunizations Discussed/Encouraged: Tdap, Shingrix, and COVID19    The above risks/problems have been discussed with the patient.  Pertinent information has been shared with the patient in the After Visit Summary.  An After Visit Summary and PPPS were made available to the patient.    Follow Up:   Next Medicare Wellness visit to be scheduled in 1 year.         Additional E&M Note during same encounter follows:  Patient has additional, significant, and separately identifiable condition(s)/problem(s) that require work above and beyond the Medicare Wellness Visit     Chief Complaint  Primary Care Follow-Up (Pt here to follow up on labs, complains of back pain for the last couple days and is getting worse, look at spot eye),  Hyperlipidemia, and Back Pain    Subjective    HPI  Hector is also being seen today for additional medical problem/s.       The patient is a 69 year-old male who presents for annual wellness and routine follow up with c/o back pain.    He reports experiencing discomfort in his lower back, which he attributes to an incident that occurred while exiting the shower. Pain was aggravated when he tried to dress himself and after bending down to put on his shoes. However, the pain intensified, particularly when lying flat on his back. He attempted to alleviate the pain with a hot shower, but this only provided temporary relief. The pain, which he rates as a 7 on a scale of 1 to 10, was exacerbated by dressing activities. He reports no radiating pain, numbness, tingling, or weakness in his lower extremities. He has not sought pharmacological intervention for the pain, preferring to avoid analgesics.    He reports no new allergies, medications, or medical conditions over the past year. His physical health remains stable, with the exception of the recent back pain. He has not been hospitalized within the past year. He is able to perform daily activities independently and reports no memory issues or forgetfulness beyond what is typical for his age. He consumes approximately half a pint of whiskey per week and vapes marijuana daily. He does not use tobacco.    He received a flu shot in 11/2024 and a tetanus shot over 10 years ago. He declines the shingles vaccine. He recently acquired new glasses and has a dental appointment scheduled for next week. He is currently taking omeprazole, cholesterol medication, vitamin B12, and vitamin D once a week.    He continues to take omeprazole daily for acid reflux, which effectively controls his symptoms. If he misses a dose, he experiences severe heartburn and chest burning by the end of the day.    He has noticed a persistent rash on one side of his face, which has been present for 1 to 2  "years. The rash does not itch or cause redness, but it does result in some discoloration and feels unusual to touch.    SOCIAL HISTORY  The patient drinks alcohol once or twice a week, consuming about half a pint of whiskey per week. The patient vapes marijuana daily but does not use tobacco.          Objective   Vital Signs:  /76 (BP Location: Right arm, Patient Position: Sitting, Cuff Size: Adult)   Pulse 66   Temp 97.7 °F (36.5 °C) (Temporal)   Resp 16   Ht 170.2 cm (67.01\")   Wt 84.4 kg (186 lb)   SpO2 97%   BMI 29.12 kg/m²   Physical Exam  Constitutional:       Appearance: Normal appearance.   HENT:      Head: Normocephalic and atraumatic.   Cardiovascular:      Heart sounds: Normal heart sounds.   Pulmonary:      Breath sounds: Normal breath sounds.   Musculoskeletal:      Lumbar back: Tenderness present. No swelling. Decreased range of motion. Negative right straight leg raise test and negative left straight leg raise test.        Back:       Comments: Lumbar paraspinal tenderness on palpation   Skin:     Comments: Hyperpigmented scaly rash on the left side of the nose bridge.   Neurological:      Mental Status: He is alert and oriented to person, place, and time.   Psychiatric:         Mood and Affect: Mood normal.           Musculoskeletal: Tenderness in the paraspinal muscle of the lumbar region    The following data was reviewed by: Ivonne Ortiz MD on 05/23/2025:    Common labs          11/6/2024    09:04 5/20/2025    08:41   Common Labs   Glucose 105  107    BUN 13  11    Creatinine 1.11  1.22    Sodium 142  141    Potassium 4.0  4.0    Chloride 105  103    Calcium 9.2  9.3    Albumin 4.4  4.5    Total Bilirubin 0.6  0.9    Alkaline Phosphatase 69  70    AST (SGOT) 23  28    ALT (SGPT) 29  31    WBC  7.03    Hemoglobin  14.5    Hematocrit  41.7    Platelets  251    Total Cholesterol 169  184    Triglycerides 106  142    HDL Cholesterol 45  55    LDL Cholesterol  105  104    PSA 1.060   "       Results  Labs   - LDL cholesterol: 104 mg/dL           Assessment and Plan     Diagnoses and all orders for this visit:    1. Medicare annual wellness visit, subsequent (Primary)    2. At moderate risk for fall    3. Mixed hyperlipidemia  -     Lipid Panel With / Chol / HDL Ratio; Future    4. Gastroesophageal reflux disease without esophagitis    5. Vitamin B12 deficiency  -     Vitamin B12; Future    6. Vitamin D deficiency, unspecified  -     Vitamin D,25-Hydroxy; Future    7. Lumbar sprain, initial encounter          1. Lumbar muscle strain.  - Symptoms include tenderness in the paraspinal muscle and difficulty finding a comfortable position, especially at night.  - Physical examination reveals tenderness in the lumbar region.  - Discussed back stretching exercises, cold compresses, heating pads, and over-the-counter pain relief options such as ibuprofen or Aleve.  - Advised to avoid activities that may exacerbate the strain, such as lifting heavy objects. Prescription for lidocaine patch if pain persists.    2. Hypercholesterolemia.  - LDL cholesterol level remains elevated at 104 mg/dL.  - Current medication regimen of atorvastatin 40 mg will be continued.  - Advised to maintain a healthy diet and regular exercise regimen, including reducing intake of saturated fats such as butter.  - Repeat cholesterol panel will be obtained in 6 months. If the target LDL level of <70 mg/dL is not achieved, consideration will be given to adding another medication.    3. Gastroesophageal reflux disease (GERD).  - Symptoms are well-controlled with daily omeprazole.  - Advised to continue current regimen and use Tums for immediate relief if needed.  - No changes to medication at this time.    4. Vitamin D deficiency.  - Vitamin D level was 40 ng/mL 6 months ago, within the normal range.  - Advised to switch from a weekly dose of 50,000 units to a daily maintenance dose of 1000 to 2000 units of vitamin D.  - Prescription  for daily maintenance dose provided.    5. Medicare wellness visit.  - Due for annual wellness check.  - Advised to get the Tdap vaccine as it has been more than 10 years since the last dose.  - Encouraged to consider the shingles vaccine, although expressed reluctance.  - Up to date with influenza vaccine; will receive it again during the next flu season.    6. Rash.  - Persistent rash on the left side of the face for 1 to 2 years, with discoloration and unusual texture but no itching or redness.  - Could be dermatitis; advised to monitor for expansion or itching.  - No immediate treatment required unless symptoms change.    Follow-up  The patient will follow up in 6 months.         Follow Up   No follow-ups on file.  Patient was given instructions and counseling regarding his condition or for health maintenance advice. Please see specific information pulled into the AVS if appropriate.  Patient or patient representative verbalized consent for the use of Ambient Listening during the visit with  Ivonne Ortiz MD for chart documentation. 5/23/2025  09:46 EDT

## 2025-05-23 NOTE — PATIENT INSTRUCTIONS
Advance Care Planning and Advance Directives     You make decisions on a daily basis - decisions about where you want to live, your career, your home, your life. Perhaps one of the most important decisions you face is your choice for future medical care. Take time to talk with your family and your healthcare team and start planning today.  Advance Care Planning is a process that can help you:  Understand possible future healthcare decisions in light of your own experiences  Reflect on those decision in light of your goals and values  Discuss your decisions with those closest to you and the healthcare professionals that care for you  Make a plan by creating a document that reflects your wishes    Surrogate Decision Maker  In the event of a medical emergency, which has left you unable to communicate or to make your own decisions, you would need someone to make decisions for you.  It is important to discuss your preferences for medical treatment with this person while you are in good health.     Qualities of a surrogate decision maker:  Willing to take on this role and responsibility  Knows what you want for future medical care  Willing to follow your wishes even if they don't agree with them  Able to make difficult medical decisions under stressful circumstances    Advance Directives  These are legal documents you can create that will guide your healthcare team and decision maker(s) when needed. These documents can be stored in the electronic medical record.    Living Will - a legal document to guide your care if you have a terminal condition or a serious illness and are unable to communicate. States vary by statute in document names/types, but most forms may include one or more of the following:        -  Directions regarding life-prolonging treatments        -  Directions regarding artificially provided nutrition/hydration        -  Choosing a healthcare decision maker        -  Direction regarding organ/tissue  donation    Durable Power of  for Healthcare - this document names an -in-fact to make medical decisions for you, but it may also allow this person to make personal and financial decisions for you. Please seek the advice of an  if you need this type of document.    **Advance Directives are not required and no one may discriminate against you if you do not sign one.    Medical Orders  Many states allow specific forms/orders signed by your physician to record your wishes for medical treatment in your current state of health. This form, signed in personal communication with your physician, addresses resuscitation and other medical interventions that you may or may not want.      For more information or to schedule a time with a [facility] Advance Care Planning Facilitator contact: [facility] [address] or call [phone] and someone will contact you directly.  You are due for adacel Tdap vaccination. (provides protection against tetanus, diptheria and whooping cough) Please  get the immunization at your local pharmacy at your earliest convenience.  Please click on the link for more information about this vaccine.    https://www.cdc.gov/vaccines/vpd/dtap-tdap-td/public/index.html      Medicare Wellness  Personal Prevention Plan of Service     Date of Office Visit:    Encounter Provider:  Ivonne Ortiz MD  Place of Service:  [facility]  Patient Name: Hector Diamond  :  1955    As part of the Medicare Wellness portion of your visit today, we are providing you with this personalized preventive plan of services (PPPS). This plan is based upon recommendations of the [place] Preventive Services Task Force (USPSTF) and the Advisory Committee on Immunization Practices (ACIP).    This lists the preventive care services that should be considered, and provides dates of when you are due. Items listed as completed are up-to-date and do not require any  further intervention.    Health Maintenance   Topic Date Due   • TDAP/TD VACCINES (1 - Tdap) Never done   • ZOSTER VACCINE (1 of 2) 11/19/2025 (Originally 6/14/2007)   • COVID-19 Vaccine (5 - 2024-25 season) 11/23/2025 (Originally 9/1/2024)   • INFLUENZA VACCINE  07/01/2025   • LIPID PANEL  05/20/2026   • ANNUAL WELLNESS VISIT  05/23/2026   • COLORECTAL CANCER SCREENING  06/14/2032   • HEPATITIS C SCREENING  Completed   • Pneumococcal Vaccine 50+  Completed   • AAA SCREEN ONCE  Completed       Orders Placed This Encounter   Procedures   • Lipid Panel With / Chol / HDL Ratio     Standing Status:   Future     Expected Date:   11/23/2025     Expiration Date:   8/23/2026     Release to patient:   Routine Release [[id number]]   • Vitamin D,25-Hydroxy     Standing Status:   Future     Expected Date:   11/23/2025     Expiration Date:   8/23/2026     Release to patient:   Routine Release [[id number]]   • Vitamin B12     Standing Status:   Future     Expected Date:   11/23/2025     Expiration Date:   8/23/2026     Release to patient:   Routine Release [[id number]]       No follow-ups on file.          Fall Prevention in the Home, Adult  Falls can cause injuries and affect people of all ages. There are many simple things that you can do to make your home safe and to help prevent falls.  If you need it, ask for help making these changes.  What actions can I take to prevent falls?  General information  Use good lighting in all rooms. Make sure to:  Replace any light bulbs that burn out.  Turn on lights if it is dark and use night-lights.  Keep items that you use often in easy-to-reach places. Lower the shelves around your home if needed.  Move furniture so that there are clear paths around it.  Do not keep throw rugs or other things on the floor that can make you trip.  If any of your floors are uneven, fix them.  Add color or contrast paint or tape to clearly hernan and help you see:  Grab bars or handrails.  First and last  steps of staircases.  Where the edge of each step is.  If you use a ladder or stepladder:  Make sure that it is fully opened. Do not climb a closed ladder.  Make sure the sides of the ladder are locked in place.  Have someone hold the ladder while you use it.  Know where your pets are as you move through your home.  What can I do in the bathroom?         Keep the floor dry. Clean up any water that is on the floor right away.  Remove soap buildup in the bathtub or shower. Buildup makes bathtubs and showers slippery.  Use non-skid mats or decals on the floor of the bathtub or shower.  Attach bath mats securely with double-sided, non-slip rug tape.  If you need to sit down while you are in the shower, use a non-slip stool.  Install grab bars by the toilet and in the bathtub and shower. Do not use towel bars as grab bars.  What can I do in the bedroom?  Make sure that you have a light by your bed that is easy to reach.  Do not use any sheets or blankets on your bed that hang to the floor.  Have a firm bench or chair with side arms that you can use for support when you get dressed.  What can I do in the kitchen?  Clean up any spills right away.  If you need to reach something above you, use a sturdy step stool that has a grab bar.  Keep electrical cables out of the way.  Do not use floor polish or wax that makes floors slippery.  What can I do with my stairs?  Do not leave anything on the stairs.  Make sure that you have a light switch at the top and the bottom of the stairs. Have them installed if you do not have them.  Make sure that there are handrails on both sides of the stairs. Fix handrails that are broken or loose. Make sure that handrails are as long as the staircases.  Install non-slip stair treads on all stairs in your home if they do not have carpet.  Avoid having throw rugs at the top or bottom of stairs, or secure the rugs with carpet tape to prevent them from moving.  Choose a carpet design that does not  hide the edge of steps on the stairs. Make sure that carpet is firmly attached to the stairs. Fix any carpet that is loose or worn.  What can I do on the outside of my home?  Use bright outdoor lighting.  Repair the edges of walkways and driveways and fix any cracks. Clear paths of anything that can make you trip, such as tools or rocks.  Add color or contrast paint or tape to clearly hernan and help you see high doorway thresholds.  Trim any bushes or trees on the main path into your home.  Check that handrails are securely fastened and in good repair. Both sides of all steps should have handrails.  Install guardrails along the edges of any raised decks or porches.  Have leaves, snow, and ice cleared regularly. Use sand, salt, or ice melt on walkways during winter months if you live where there is ice and snow.  In the garage, clean up any spills right away, including grease or oil spills.  What other actions can I take?  Review your medicines with your health care provider. Some medicines can make you confused or feel dizzy. This can increase your chance of falling.  Wear closed-toe shoes that fit well and support your feet. Wear shoes that have rubber soles and low heels.  Use a cane, walker, scooter, or crutches that help you move around if needed.  Talk with your provider about other ways that you can decrease your risk of falls. This may include seeing a physical therapist to learn to do exercises to improve movement and strength.  Where to find more information  Centers for Disease Control and Prevention, AREN: cdc.gov  National [place] on Aging: tawana.nih.gov  National [place] on Aging: tawana.nih.gov  Contact a health care provider if:  You are afraid of falling at home.  You feel weak, drowsy, or dizzy at home.  You fall at home.  Get help right away if you:  Lose consciousness or have trouble moving after a fall.  Have a fall that causes a head injury.  These symptoms may be an emergency. Get help right  away. Call 911.  Do not wait to see if the symptoms will go away.  Do not drive yourself to the hospital.  This information is not intended to replace advice given to you by your health care provider. Make sure you discuss any questions you have with your health care provider.  Document Revised: 08/21/2023 Document Reviewed: 08/21/2023  Elsevier Patient Education © 2024 [employer]